# Patient Record
Sex: FEMALE | Race: WHITE
[De-identification: names, ages, dates, MRNs, and addresses within clinical notes are randomized per-mention and may not be internally consistent; named-entity substitution may affect disease eponyms.]

---

## 2021-03-17 ENCOUNTER — HOSPITAL ENCOUNTER (EMERGENCY)
Dept: HOSPITAL 26 - MED | Age: 26
Discharge: HOME | End: 2021-03-17
Payer: COMMERCIAL

## 2021-03-17 VITALS — DIASTOLIC BLOOD PRESSURE: 72 MMHG | SYSTOLIC BLOOD PRESSURE: 124 MMHG

## 2021-03-17 VITALS — BODY MASS INDEX: 29.01 KG/M2 | HEIGHT: 65 IN | WEIGHT: 174.12 LBS

## 2021-03-17 DIAGNOSIS — G89.29: Primary | ICD-10-CM

## 2021-03-17 DIAGNOSIS — Z88.0: ICD-10-CM

## 2021-03-17 DIAGNOSIS — M54.9: ICD-10-CM

## 2021-03-17 DIAGNOSIS — Z79.899: ICD-10-CM

## 2021-03-17 DIAGNOSIS — F12.90: ICD-10-CM

## 2021-03-17 NOTE — NUR
25 Y/O FEMALE C/O MID BACK PAIN ON & OFF X 1 YEAR. PT STATED SHE WAS MOVING 
LAST YEAR AND MOVING BOXES WHEN THE PAIN STARTED. PT WENT ON DISABILITY AND 
WENT TO PT. PT HAD 2 MRI AND  SAID IT WAS A NONCANCEROUS BLOOD VESSEL. PT 
DENIES RECENT TRAUMA. PT RATES PAIN 8/10 THAT IS BURNING AND THROBBING AND 
RADIATES DOWN TO BOTH LEGS. PT TOOK TYLENOL WITH NO RELIEF. PT DENIES N/V/SOB. 
PT IS A/O X4 WITH EVEN AND UNLABORED RESPIRATIONS. 



PMH: DENIES

NKA

## 2021-03-17 NOTE — NUR
Patient discharged with v/s stable. Written and verbal after care instructions 
given and explained. 

Patient alert, oriented and verbalized understanding of instructions. 
Ambulatory with steady gait. All questions addressed prior to discharge. ID 
band removed. Patient advised to follow up with PMD. Rx of naproxen given. 
Patient educated on indication of medication including possible reaction and 
side effects. Opportunity to ask questions provided and answered.

## 2022-04-10 ENCOUNTER — HOSPITAL ENCOUNTER (EMERGENCY)
Dept: HOSPITAL 26 - MED | Age: 27
LOS: 1 days | Discharge: HOME | End: 2022-04-11
Payer: COMMERCIAL

## 2022-04-10 VITALS — HEIGHT: 64 IN | BODY MASS INDEX: 30.39 KG/M2 | WEIGHT: 178 LBS

## 2022-04-10 VITALS — SYSTOLIC BLOOD PRESSURE: 120 MMHG | DIASTOLIC BLOOD PRESSURE: 85 MMHG
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
## 2022-04-10 DIAGNOSIS — S92.351A: Primary | ICD-10-CM

## 2022-04-10 DIAGNOSIS — Z79.899: ICD-10-CM

## 2022-04-10 DIAGNOSIS — Z88.0: ICD-10-CM
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
## 2022-04-11 VITALS — DIASTOLIC BLOOD PRESSURE: 78 MMHG | SYSTOLIC BLOOD PRESSURE: 116 MMHG

## 2022-04-11 NOTE — NUR
-------------------------------------------------------------------------------

            *** Note undone in [facility] - 04/11/22 at 0051 by [facility] ***             

-------------------------------------------------------------------------------

DAVE RN AT BEDSIDE.

## 2022-04-11 NOTE — NUR
Patient discharged with v/s stable. Written and verbal after care instructions 
given and explained. 

Patient alert, oriented and verbalized understanding of instructions. 
Ambulatory with by parent. All questions addressed prior to discharge. ID band 
removed. Patient advised to follow up with PMD. Rx of naprosyn, hydrocodone 
given. Patient educated on indication of medication including possible reaction 
and side effects. Opportunity to ask questions provided and answered.